# Patient Record
Sex: MALE | Race: WHITE | NOT HISPANIC OR LATINO | URBAN - METROPOLITAN AREA
[De-identification: names, ages, dates, MRNs, and addresses within clinical notes are randomized per-mention and may not be internally consistent; named-entity substitution may affect disease eponyms.]

---

## 2019-03-01 NOTE — PATIENT DISCUSSION
Add 37590 Cpt? (Important Note: In 2017 The Use Of 86461 Is Being Tracked By Cms To Determine Future Global Period Reimbursement For Global Periods): yes Cataract symptoms i.e., glare, blur discussed. Pt to call if worsening vision or trouble with driving, TV, reading, ADL. UV precautions. Reviewed possibility of future cataract surgery. Detail Level: Simple

## 2022-01-10 ENCOUNTER — NEW PATIENT (OUTPATIENT)
Dept: URBAN - METROPOLITAN AREA CLINIC 53 | Facility: CLINIC | Age: 52
End: 2022-01-10

## 2022-01-10 DIAGNOSIS — H25.13: ICD-10-CM

## 2022-01-10 DIAGNOSIS — Z01.01: ICD-10-CM

## 2022-01-10 DIAGNOSIS — H00.14: ICD-10-CM

## 2022-01-10 PROCEDURE — 92004 COMPRE OPH EXAM NEW PT 1/>: CPT

## 2022-01-10 PROCEDURE — 92015 DETERMINE REFRACTIVE STATE: CPT

## 2022-01-10 ASSESSMENT — VISUAL ACUITY
OD_SC: 20/20
OD_CC: J3
OS_SC: 20/20-2
OU_CC: J2@ 14 IN
OU_SC: 20/20
OS_CC: J3

## 2022-01-10 ASSESSMENT — KERATOMETRY
OD_AXISANGLE2_DEGREES: 93
OS_AXISANGLE_DEGREES: 007
OS_K1POWER_DIOPTERS: 43.75
OS_K2POWER_DIOPTERS: 44.25
OS_AXISANGLE2_DEGREES: 97
OD_AXISANGLE_DEGREES: 003
OD_K1POWER_DIOPTERS: 43.50
OD_K2POWER_DIOPTERS: 44.50

## 2022-01-10 ASSESSMENT — TONOMETRY
OD_IOP_MMHG: 15
OS_IOP_MMHG: 15

## 2022-01-11 ENCOUNTER — PREPPED CHART (OUTPATIENT)
Dept: URBAN - METROPOLITAN AREA CLINIC 49 | Facility: CLINIC | Age: 52
End: 2022-01-11

## 2022-01-12 ENCOUNTER — CONSULTATION/EVALUATION (OUTPATIENT)
Dept: URBAN - METROPOLITAN AREA CLINIC 49 | Facility: CLINIC | Age: 52
End: 2022-01-12

## 2022-01-12 DIAGNOSIS — D23.121: ICD-10-CM

## 2022-01-12 PROCEDURE — 92285 EXTERNAL OCULAR PHOTOGRAPHY: CPT

## 2022-01-12 PROCEDURE — 92012 INTRM OPH EXAM EST PATIENT: CPT

## 2022-01-12 ASSESSMENT — VISUAL ACUITY
OU_SC: 20/20
OS_SC: 20/30
OD_SC: 20/20
OS_PH: 20/25

## 2022-01-12 ASSESSMENT — TONOMETRY
OD_IOP_MMHG: 14
OS_IOP_MMHG: 16

## 2022-01-12 NOTE — PATIENT DISCUSSION
Started patient on Doxycycline 100mg BID for 3 days. D/w patient we can excise in office. Patient agrees to proceed.

## 2022-01-17 ENCOUNTER — CLINIC PROCEDURE ONLY (OUTPATIENT)
Dept: URBAN - METROPOLITAN AREA CLINIC 49 | Facility: CLINIC | Age: 52
End: 2022-01-17

## 2022-01-17 VITALS — DIASTOLIC BLOOD PRESSURE: 92 MMHG | SYSTOLIC BLOOD PRESSURE: 128 MMHG | HEART RATE: 72 BPM | HEIGHT: 60 IN

## 2022-01-17 DIAGNOSIS — D23.121: ICD-10-CM

## 2022-01-17 PROCEDURE — 11441 EXC FACE-MM B9+MARG 0.6-1 CM: CPT

## 2022-01-17 PROCEDURE — 92285 EXTERNAL OCULAR PHOTOGRAPHY: CPT

## 2022-01-17 ASSESSMENT — VISUAL ACUITY
OD_SC: 20/20
OS_PH: 20/25
OS_SC: 20/30

## 2022-01-17 ASSESSMENT — TONOMETRY
OS_IOP_MMHG: 14
OD_IOP_MMHG: 14

## 2022-01-17 NOTE — PROCEDURE NOTE: CLINICAL
PROCEDURE NOTE: Excision Benign Lesion Including Margins, except Skin Tag, Face, Ears, Eyelids, Nose, Lips, Mucous Membrane; Excised Diameter 0.6 to 1.0 cm Left Upper Lid. 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 Anesthesia: 2% Lidocaine with Epi. The patient, procedure, and the correct site were identified. Prior to treatment, the risks/benefits/alternatives were discussed. The patient wished to proceed with procedure. After the risks, benefits, and alternatives were explained to the patient, informed consent was obtained. Topical proparacaine drops were instilled in the eye. 2% Lidocaine jelly was instilled in the eye. The eyelid skin was prepped with an alcohol wipe. A subcutaneous injection of 2% lidocaine with 1:100,000 epinephrine was administered via a TB syringe and 27 g needle. A reasonable period of time was allowed for anesthesia to take effect. A clamp was placed on the eyelid. The lesion was grasped with forceps and excised with scissors while taking care to preserve as much of the tarsus and lid margin as reasonably possible while still permitting an adequate and sizeable specimen for pathology review and confirmation of clean margins. The patient tolerated the procedure well and left the room in good condition. Post-procedurally, wound care instructions were given to the patient. Sugar Duarte